# Patient Record
Sex: FEMALE
[De-identification: names, ages, dates, MRNs, and addresses within clinical notes are randomized per-mention and may not be internally consistent; named-entity substitution may affect disease eponyms.]

---

## 2024-02-22 ENCOUNTER — APPOINTMENT (OUTPATIENT)
Dept: ORTHOPEDIC SURGERY | Facility: CLINIC | Age: 29
End: 2024-02-22
Payer: COMMERCIAL

## 2024-02-22 VITALS — BODY MASS INDEX: 31.41 KG/M2 | WEIGHT: 160 LBS | HEIGHT: 60 IN

## 2024-02-22 PROBLEM — Z00.00 ENCOUNTER FOR PREVENTIVE HEALTH EXAMINATION: Status: ACTIVE | Noted: 2024-02-22

## 2024-02-22 PROCEDURE — 99204 OFFICE O/P NEW MOD 45 MIN: CPT

## 2024-03-14 ENCOUNTER — APPOINTMENT (OUTPATIENT)
Dept: ORTHOPEDIC SURGERY | Facility: CLINIC | Age: 29
End: 2024-03-14
Payer: COMMERCIAL

## 2024-03-14 VITALS — BODY MASS INDEX: 31.41 KG/M2 | WEIGHT: 160 LBS | HEIGHT: 60 IN

## 2024-03-14 DIAGNOSIS — S52.041A DISPLACED FRACTURE OF CORONOID PROCESS OF RIGHT ULNA, INITIAL ENCOUNTER FOR CLOSED FRACTURE: ICD-10-CM

## 2024-03-14 PROCEDURE — 99213 OFFICE O/P EST LOW 20 MIN: CPT | Mod: 25

## 2024-03-14 PROCEDURE — 73080 X-RAY EXAM OF ELBOW: CPT | Mod: RT

## 2024-03-15 NOTE — HISTORY OF PRESENT ILLNESS
[de-identified] : Ms. PAULA ASHLEY is a 28 year old RHD woman presents today for follow up evaluation of a right coronoid fracture sustained on 2/16/24, being treated nonoperatively. Since her last visit she states she is feeling better. She is happy with her progress.   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

## 2024-03-15 NOTE — PHYSICAL EXAM
[de-identified] : The patient is sitting comfortably in the exam room.  RIGHT arm. -Skin is intact, no swelling, no ecchymosis -Tenderness to palpation medially or laterally -No pain with palpation over the radial head with range of motion -Range of motion elbow 0-130 -Pronation 90, supination 90 -Stable to varus and valgus stress -Able to make a fist -Sensation is intact median, radial, ulnar, axillary nerves -Motor is intact median, radial, ulnar, axillary nerves -Hand is warm and well-perfused, Palpable radial and ulnar pulses  [de-identified] :  X-rays of the right elbow were taken in the office today, 3/14/24. AP, Oblique and Lateral excellent overall alignment of the elbow.  No new fractures or dislocations.  The radiocapitellar joint is well aligned.  There is a small maureen from the coronoid appreciated.

## 2024-03-15 NOTE — DISCUSSION/SUMMARY
[de-identified] : 28-year-old woman with a right coronoid fracture, nearly 4 weeks out, treated nonoperatively.   -X-ray and physical exam findings were discussed with the patient - weightbearing as tolerated right upper extremity -Work on ROM of the right elbow to prevent stiffness -Follow-up in 6 weeks with x-rays of the right elbow at that time.  -All of the patient's questions and concerns were addressed.

## 2024-04-08 NOTE — HISTORY OF PRESENT ILLNESS
[de-identified] : Ms. PAULA ASHLEY is a 28 year old RHD woman presents today for initial evaluation of a right elbow injury sustained on 2/16/24. She hit her elbow into a . She was seen at [facility] where x-rays were performed. She notes pain at the medial and lateral aspect of the right elbow. She is taking Ibuprofen and Tylenol for the pain with moderate relief.   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

## 2024-04-08 NOTE — PHYSICAL EXAM
[de-identified] : The patient is sitting comfortably in the exam room.  RIGHT arm. -Skin is intact, no swelling, no ecchymosis -Tenderness to palpation medially or laterally -No pain with palpation over the radial head with range of motion -Range of motion elbow 0-90 -Pronation 90, supination 90 -Stable to varus and valgus stress -Able to make a fist -Sensation is intact median, radial, ulnar, axillary nerves -Motor is intact median, radial, ulnar, axillary nerves -Hand is warm and well-perfused, Palpable radial and ulnar pulses  [de-identified] : Xrays of the right elbow were taking at [facility] on 2/16/24

## 2024-04-08 NOTE — DISCUSSION/SUMMARY
[de-identified] : 28-year-old woman with a right coronoid fracture, approximately 6 days out.  -The risks and benefits of operative versus nonoperative management were discussed at length with the patient. The patient shows a good understanding of the injury and treatment options. They would like to move forward with nonoperative management -NWB right UE -Work on ROM of the right elbow to prevent stiffness -Follow-up in 3 weeks with x-rays of the right elbow at that time.  -All of the patient's questions and concerns were addressed.

## 2024-04-25 ENCOUNTER — APPOINTMENT (OUTPATIENT)
Dept: ORTHOPEDIC SURGERY | Facility: CLINIC | Age: 29
End: 2024-04-25